# Patient Record
(demographics unavailable — no encounter records)

---

## 2024-10-23 NOTE — HISTORY OF PRESENT ILLNESS
[FreeTextEntry1] : 02/07/2024: Mr. ALEJO is a 75 year male who presents today for Elevated PSA   PSA:  11/12/15: 0.53 ng/mL  05/09/18: 2.07 ng/mL 11/04/23: 4.8 ng/mL Free PSA: 1.33  Pt has family h/o prostate cancer. His father was dx at the age of 72   Denies hematuria, dysuria, and hesitancy.  Stating he does not wake up at night to urinate, however has frequent urination at times. Goes about 3-4 x during the day   O/E: circumcised phallus, adequate meatus, both testes descended, nontender, no mass palpable.  RADHA: 2 + firm, non-tender, non-nodular prostate. No rectal mass.   PVR: 0 ml.  PSA drawn today, MR prostate ordered.  RTC: 3 weeks to review  prostate.      02/28/2024: Mr. ALEJO is a 75 year male who presents today for a follow up for Elevated PSA  PSA:  11/12/15: 0.53 ng/mL  05/09/18: 2.07 ng/mL 11/04/23: 4.8 ng/mL Free PSA: 1.33  02/07/24: 4.8 ng/mL Pt has family h/o prostate cancer. His father was dx at the age of 72 02/16/24: MR of Prostate: PIRADS 2 , Volume: 32 cc ., PSA density: 0.15 ng/mL/mL Will recheck PSA in 3 months    frequent urination: Split urinary stream  Pt. is taking Jardiance for Diabetes.  Stating he has frequent urination and split voiding.  frequent urination most likely secondary to medication  Denies hematuria, dysuria, and hesitancy.   RTC: 3 months: Follow up: PSA, UA, culture, uroflow and bladder scan     05/29/2024: 75 year old male presents for a follow up visit for Elevated PSA. FMHx: prostate cancer (His father was dx at the age of 72)  Pt still reports frequency (every 3 hours). Informed pt his frequency likely related to pt taking Jardiance for his DM. Pt voiding and emptying well. PVR today: 0 cc.  PSA:  11/12/15: 0.53 ng/mL  05/09/18: 2.07 ng/mL 11/04/23: 4.8 ng/mL Free PSA: 1.33  02/07/24: 4.8 ng/mL 02/16/24: MR of Prostate: PIRADS 2, Volume: 32 cc, PSA density: 0.15 ng/mL/mL PSA drawn today. If elevated will repeat MRI.  Uroflowmetry today:         Maximum Flow	8.7	 	 	  	Average Flow	4.0	 	 		  	Voiding Time	32.1	 	 	  	Flow Time	32.0	 	 		  	Time to Max Flow	4.1	 	 		  	Voided Volume	129 ml  PVR today: 0 cc  Urinalysis and culture done today. RTO in 6 months for follow up visit: UA, culture, Uroflow, PVR, and PSA.    10/23/2024 Pt is a 75 y/o male who presents today for a f/u visit for Elevated PSA.  FMHx: prostate cancer (His father was dx at the age of 72)  PSA:  11/12/15: 0.53 ng/mL  05/09/18: 2.07 ng/mL 11/04/23: 4.8 ng/mL Free PSA: 1.33  02/07/24: 4.8 ng/mL 02/16/24: MR of Prostate: PIRADS 2, Volume: 32 cc, PSA density: 0.15 ng/mL/mL 05/30/24; 0.70 ng/mL  PSA Stable  PSA drawn today.   BPH  No medications at presents. Pt denies nocturia. He voids every 2 hrs during the day secondary jardiance for DM. Pt report a plit stream and post void dribbling. He drips after he's finished. Pt not happy with dribbling. He is voiding and emptying his bladder well.   Uroflow  Maximum Flow	6.8	 	 		  	Average Flow	3.5	 	 		  	Voiding Time	40.2	 	 		  	Flow Time	26.2	 	 		  	Time to Max Flow	6.5	 	 		  	Voided Volume	91 ml  PVR 3 cc  Will schedule Cystoscopy with possible biopsy, fulguration and dilatation to further investigate post void dribbling.  RTO 1 month for cysto, uroflow, PVR

## 2024-10-23 NOTE — REVIEW OF SYSTEMS
[Eyesight Problems] : eyesight problems [Dry Eyes] : dryness of the eyes [Cough] : cough [see HPI] : see HPI [Negative] : Heme/Lymph

## 2024-10-23 NOTE — LETTER BODY
[Dear  ___] : Dear  [unfilled], [Consult Letter:] : I had the pleasure of evaluating your patient, [unfilled]. [Please see my note below.] : Please see my note below. [Consult Closing:] : Thank you very much for allowing me to participate in the care of this patient.  If you have any questions, please do not hesitate to contact me. [Sincerely,] : Sincerely, [FreeTextEntry3] : Ernesto Tate MD

## 2024-10-23 NOTE — END OF VISIT
[FreeTextEntry4] :  This note was written by Chip Tompkins on 10/23/2024 actively solely Ernesto Frankel M.D. I, Chip Tompkins, am scribing for and in the presence of Ernesto Frankel M.D. in the following sections HISTORY OF PRESENT ILLNESS, PAST MEDICAL/FAMILY/SOCIAL HISTORY; REVIEW OF SYSTEMS; VITAL SIGNS; PHYSICAL EXAM; ASSESSMENT/PLAN.     All medical record entries made by this scribe at my, Ernesto Frankel M.D. direction and personally dictated by me on 10/23/2024. I personally performed the services described in the documentation, reviewed the documentation recorded by the scribe in my presence, and it accurately and completely records my words and actions. [Time Spent: ___ minutes] : I have spent [unfilled] minutes of time on the encounter which excludes teaching and separately reported services.

## 2025-01-15 NOTE — END OF VISIT
[Time Spent: ___ minutes] : I have spent [unfilled] minutes of time on the encounter which excludes teaching and separately reported services. [FreeTextEntry4] :  This note was written by Chip Tompkins on 01/15/2025 actively solely Ernesto Frankel M.D. I, Chip Tompkins, am scribing for and in the presence of Ernesto Frankel M.D. in the following sections HISTORY OF PRESENT ILLNESS, PAST MEDICAL/FAMILY/SOCIAL HISTORY; REVIEW OF SYSTEMS; VITAL SIGNS; PHYSICAL EXAM; ASSESSMENT/PLAN.     All medical record entries made by this scribe at my, Ernesto Frankel M.D. direction and personally dictated by me on 01/15/2025. I personally performed the services described in the documentation, reviewed the documentation recorded by the scribe in my presence, and it accurately and completely records my words and actions.

## 2025-01-15 NOTE — HISTORY OF PRESENT ILLNESS
[FreeTextEntry1] : 02/07/2024: Mr. ALEJO is a 75 year male who presents today for Elevated PSA   PSA:  11/12/15: 0.53 ng/mL  05/09/18: 2.07 ng/mL 11/04/23: 4.8 ng/mL Free PSA: 1.33  Pt has family h/o prostate cancer. His father was dx at the age of 72   Denies hematuria, dysuria, and hesitancy.  Stating he does not wake up at night to urinate, however has frequent urination at times. Goes about 3-4 x during the day   O/E: circumcised phallus, adequate meatus, both testes descended, nontender, no mass palpable.  RADHA: 2 + firm, non-tender, non-nodular prostate. No rectal mass.   PVR: 0 ml.  PSA drawn today, MR prostate ordered.  RTC: 3 weeks to review  prostate.      02/28/2024: Mr. ALEJO is a 75 year male who presents today for a follow up for Elevated PSA  PSA:  11/12/15: 0.53 ng/mL  05/09/18: 2.07 ng/mL 11/04/23: 4.8 ng/mL Free PSA: 1.33  02/07/24: 4.8 ng/mL Pt has family h/o prostate cancer. His father was dx at the age of 72 02/16/24: MR of Prostate: PIRADS 2 , Volume: 32 cc ., PSA density: 0.15 ng/mL/mL Will recheck PSA in 3 months    frequent urination: Split urinary stream  Pt. is taking Jardiance for Diabetes.  Stating he has frequent urination and split voiding.  frequent urination most likely secondary to medication  Denies hematuria, dysuria, and hesitancy.   RTC: 3 months: Follow up: PSA, UA, culture, uroflow and bladder scan     05/29/2024: 75 year old male presents for a follow up visit for Elevated PSA. FMHx: prostate cancer (His father was dx at the age of 72)  Pt still reports frequency (every 3 hours). Informed pt his frequency likely related to pt taking Jardiance for his DM. Pt voiding and emptying well. PVR today: 0 cc.  PSA:  11/12/15: 0.53 ng/mL  05/09/18: 2.07 ng/mL 11/04/23: 4.8 ng/mL Free PSA: 1.33  02/07/24: 4.8 ng/mL 02/16/24: MR of Prostate: PIRADS 2, Volume: 32 cc, PSA density: 0.15 ng/mL/mL PSA drawn today. If elevated will repeat MRI.  Uroflowmetry today:         Maximum Flow	8.7	 	 	  	Average Flow	4.0	 	 		  	Voiding Time	32.1	 	 	  	Flow Time	32.0	 	 		  	Time to Max Flow	4.1	 	 		  	Voided Volume	129 ml  PVR today: 0 cc  Urinalysis and culture done today. RTO in 6 months for follow up visit: UA, culture, Uroflow, PVR, and PSA.    10/23/2024 Pt is a 77 y/o male who presents today for a f/u visit for Elevated PSA.  FMHx: prostate cancer (His father was dx at the age of 72)  PSA:  11/12/15: 0.53 ng/mL  05/09/18: 2.07 ng/mL 11/04/23: 4.8 ng/mL Free PSA: 1.33  02/07/24: 4.8 ng/mL 02/16/24: MR of Prostate: PIRADS 2, Volume: 32 cc, PSA density: 0.15 ng/mL/mL 05/30/24; 0.70 ng/mL  PSA Stable  PSA drawn today.   BPH  No medications at presents. Pt denies nocturia. He voids every 2 hrs during the day secondary jardiance for DM. Pt report a plit stream and post void dribbling. He drips after he's finished. Pt not happy with dribbling. He is voiding and emptying his bladder well.   Uroflow  Maximum Flow	6.8	 	 		  	Average Flow	3.5	 	 		  	Voiding Time	40.2	 	 		  	Flow Time	26.2	 	 		  	Time to Max Flow	6.5	 	 		  	Voided Volume	91 ml  PVR 3 cc  Will schedule Cystoscopy with possible biopsy, fulguration and dilatation to further investigate post void dribbling.  RTO 1 month for cysto, uroflow, PVR     01/15/2025 77 y/o male presents today for BPH w/ LUTS and EPSA FMHx: prostate cancer (His father was dx at the age of 72)  BPH w/ LUTS No medications at presents. Pt denies nocturia. He voids every 2 hrs during the day secondary jardiance for DM. Pt report a plit stream and post void dribbling. He drips after he's finished. Pt not happy with dribbling. He is voiding and emptying his bladder well. Pt defers Cystoscopy today.   Uroflow  	Maximum Flow	10.1	 	 	  	Average Flow	5.1	 	 	  	Voiding Time	55.2	 	 	  	Flow Time	48.6	 	 	  	Time to Max Flow	8.2	 	 	  	Voided Volume	246 ml	 	 	   PVR 51 cc  Will start pt on Tamsulosin 0.4 MG and reevaluate symptoms in 3 months   PSA:  11/12/15: 0.53 ng/mL  05/09/18: 2.07 ng/mL 11/04/23: 4.8 ng/mL Free PSA: 1.33  02/07/24: 4.8 ng/mL 02/16/24: MR of Prostate: PIRADS 2, Volume: 32 cc, PSA density: 0.15 ng/mL/mL 05/30/24; 0.70 ng/mL  10/23/24: 0.56 ng/mL PSA Stable   RTC 3 months  to review effects of medication follow up for visit Uroflow, PVR, and UA and Culture

## 2025-01-15 NOTE — PHYSICAL EXAM
[Normal Appearance] : normal appearance [Well Groomed] : well groomed [General Appearance - In No Acute Distress] : no acute distress [Edema] : no peripheral edema [Respiration, Rhythm And Depth] : normal respiratory rhythm and effort [Exaggerated Use Of Accessory Muscles For Inspiration] : no accessory muscle use [Abdomen Soft] : soft [Abdomen Tenderness] : non-tender [Costovertebral Angle Tenderness] : no ~M costovertebral angle tenderness [Urinary Bladder Findings] : the bladder was normal on palpation [Normal Station and Gait] : the gait and station were normal for the patient's age [] : no rash [No Focal Deficits] : no focal deficits [Oriented To Time, Place, And Person] : oriented to person, place, and time [Affect] : the affect was normal [Mood] : the mood was normal [No Palpable Adenopathy] : no palpable adenopathy [Chaperone Present] : A chaperone was present in the examining room during all aspects of the physical examination [FreeTextEntry2] : Chip Tompkins

## 2025-04-09 NOTE — HISTORY OF PRESENT ILLNESS
[FreeTextEntry1] : 02/07/2024: Mr. ALEJO is a 75 year male who presents today for Elevated PSA   PSA:  11/12/15: 0.53 ng/mL  05/09/18: 2.07 ng/mL 11/04/23: 4.8 ng/mL Free PSA: 1.33  Pt has family h/o prostate cancer. His father was dx at the age of 72   Denies hematuria, dysuria, and hesitancy.  Stating he does not wake up at night to urinate, however has frequent urination at times. Goes about 3-4 x during the day   O/E: circumcised phallus, adequate meatus, both testes descended, nontender, no mass palpable.  RADHA: 2 + firm, non-tender, non-nodular prostate. No rectal mass.   PVR: 0 ml.  PSA drawn today, MR prostate ordered.  RTC: 3 weeks to review  prostate.      02/28/2024: Mr. ALEJO is a 75 year male who presents today for a follow up for Elevated PSA  PSA:  11/12/15: 0.53 ng/mL  05/09/18: 2.07 ng/mL 11/04/23: 4.8 ng/mL Free PSA: 1.33  02/07/24: 4.8 ng/mL Pt has family h/o prostate cancer. His father was dx at the age of 72 02/16/24: MR of Prostate: PIRADS 2 , Volume: 32 cc ., PSA density: 0.15 ng/mL/mL Will recheck PSA in 3 months    frequent urination: Split urinary stream  Pt. is taking Jardiance for Diabetes.  Stating he has frequent urination and split voiding.  frequent urination most likely secondary to medication  Denies hematuria, dysuria, and hesitancy.   RTC: 3 months: Follow up: PSA, UA, culture, uroflow and bladder scan     05/29/2024: 75 year old male presents for a follow up visit for Elevated PSA. FMHx: prostate cancer (His father was dx at the age of 72)  Pt still reports frequency (every 3 hours). Informed pt his frequency likely related to pt taking Jardiance for his DM. Pt voiding and emptying well. PVR today: 0 cc.  PSA:  11/12/15: 0.53 ng/mL  05/09/18: 2.07 ng/mL 11/04/23: 4.8 ng/mL Free PSA: 1.33  02/07/24: 4.8 ng/mL 02/16/24: MR of Prostate: PIRADS 2, Volume: 32 cc, PSA density: 0.15 ng/mL/mL PSA drawn today. If elevated will repeat MRI.  Uroflowmetry today:         Maximum Flow	8.7	 	 	  	Average Flow	4.0	 	 		  	Voiding Time	32.1	 	 	  	Flow Time	32.0	 	 		  	Time to Max Flow	4.1	 	 		  	Voided Volume	129 ml  PVR today: 0 cc  Urinalysis and culture done today. RTO in 6 months for follow up visit: UA, culture, Uroflow, PVR, and PSA.    10/23/2024 Pt is a 75 y/o male who presents today for a f/u visit for Elevated PSA.  FMHx: prostate cancer (His father was dx at the age of 72)  PSA:  11/12/15: 0.53 ng/mL  05/09/18: 2.07 ng/mL 11/04/23: 4.8 ng/mL Free PSA: 1.33  02/07/24: 4.8 ng/mL 02/16/24: MR of Prostate: PIRADS 2, Volume: 32 cc, PSA density: 0.15 ng/mL/mL 05/30/24; 0.70 ng/mL  PSA Stable  PSA drawn today.   BPH  No medications at presents. Pt denies nocturia. He voids every 2 hrs during the day secondary jardiance for DM. Pt report a plit stream and post void dribbling. He drips after he's finished. Pt not happy with dribbling. He is voiding and emptying his bladder well.   Uroflow  Maximum Flow	6.8	 	 		  	Average Flow	3.5	 	 		  	Voiding Time	40.2	 	 		  	Flow Time	26.2	 	 		  	Time to Max Flow	6.5	 	 		  	Voided Volume	91 ml  PVR 3 cc  Will schedule Cystoscopy with possible biopsy, fulguration and dilatation to further investigate post void dribbling.  RTO 1 month for cysto, uroflow, PVR     01/15/2025 75 y/o male presents today for BPH w/ LUTS and EPSA FMHx: prostate cancer (His father was dx at the age of 72)  BPH w/ LUTS No medications at presents. Pt denies nocturia. He voids every 2 hrs during the day secondary jardiance for DM. Pt report a plit stream and post void dribbling. He drips after he's finished. Pt not happy with dribbling. He is voiding and emptying his bladder well. Pt defers Cystoscopy today.   Uroflow  	Maximum Flow	10.1	 	 	  	Average Flow	5.1	 	 	  	Voiding Time	55.2	 	 	  	Flow Time	48.6	 	 	  	Time to Max Flow	8.2	 	 	  	Voided Volume	246 ml	 	 	   PVR 51 cc  Will start pt on Tamsulosin 0.4 MG and reevaluate symptoms in 3 months   PSA:  11/12/15: 0.53 ng/mL  05/09/18: 2.07 ng/mL 11/04/23: 4.8 ng/mL Free PSA: 1.33  02/07/24: 4.8 ng/mL 02/16/24: MR of Prostate: PIRADS 2, Volume: 32 cc, PSA density: 0.15 ng/mL/mL 05/30/24; 0.70 ng/mL  10/23/24: 0.56 ng/mL PSA Stable   RTC 3 months  to review effects of medication follow up for visit Uroflow, PVR, and UA and Culture        04/09/2025, 75 y/o male presents with a follow up visit for BPH w/ LUTS and EPSA  BPH: Patient currently taking Tamsulosin 0.4 MG. Patient reports he is doing much better on medication. The split stream has subsided and the post void dribbling is now minimal. Patient denies any difficulty passing urine, frequency, urgency, dysuria or hematuria.  Uroflow: Unable to flow today   PVR 2cc  Patient is voiding and emptying his bladder well. Will continue Tamsulosin.  EPSA: 11/12/15: 0.53 ng/mL  05/09/18: 2.07 ng/mL 11/04/23: 4.8 ng/mL Free PSA: 1.33  02/07/24: 4.8 ng/mL 02/16/24: MR of Prostate: PIRADS 2, Volume: 32 cc, PSA density: 0.15 ng/mL/mL 05/30/24; 0.70 ng/mL  10/23/24: 0.56 ng/mL STABLE  PSA Drawn today   UA and Culture Taken   RTC 1 year  follow up for visit PSA, Uroflow, PVR, and UA and Culture

## 2025-04-09 NOTE — END OF VISIT
[Time Spent: ___ minutes] : I have spent [unfilled] minutes of time on the encounter which excludes teaching and separately reported services. [FreeTextEntry4] :  This note was written by Chip Tompkins on 04/09/2025 actively solely Ernesto Frankel M.D. I, Chip Tompkins, am scribing for and in the presence of Ernesto Frankel M.D. in the following sections HISTORY OF PRESENT ILLNESS, PAST MEDICAL/FAMILY/SOCIAL HISTORY; REVIEW OF SYSTEMS; VITAL SIGNS; PHYSICAL EXAM; ASSESSMENT/PLAN.     All medical record entries made by this scribe at my, Ernesto Frankel M.D. direction and personally dictated by me on 04/09/2025. I personally performed the services described in the documentation, reviewed the documentation recorded by the scribe in my presence, and it accurately and completely records my words and actions.